# Patient Record
Sex: FEMALE | Race: BLACK OR AFRICAN AMERICAN | NOT HISPANIC OR LATINO | Employment: PART TIME | URBAN - METROPOLITAN AREA
[De-identification: names, ages, dates, MRNs, and addresses within clinical notes are randomized per-mention and may not be internally consistent; named-entity substitution may affect disease eponyms.]

---

## 2024-03-29 ENCOUNTER — PATIENT MESSAGE (OUTPATIENT)
Dept: FAMILY MEDICINE CLINIC | Facility: CLINIC | Age: 19
End: 2024-03-29
Payer: COMMERCIAL

## 2024-04-15 ENCOUNTER — OFFICE VISIT (OUTPATIENT)
Dept: FAMILY MEDICINE CLINIC | Facility: CLINIC | Age: 19
End: 2024-04-15
Payer: COMMERCIAL

## 2024-04-15 VITALS
DIASTOLIC BLOOD PRESSURE: 72 MMHG | HEIGHT: 60 IN | BODY MASS INDEX: 25.35 KG/M2 | OXYGEN SATURATION: 100 % | HEART RATE: 97 BPM | WEIGHT: 129.1 LBS | SYSTOLIC BLOOD PRESSURE: 104 MMHG

## 2024-04-15 DIAGNOSIS — J35.1 TONSILLAR HYPERTROPHY: ICD-10-CM

## 2024-04-15 DIAGNOSIS — L74.510 AXILLARY HYPERHIDROSIS: ICD-10-CM

## 2024-04-15 DIAGNOSIS — Z86.19 HISTORY OF CHLAMYDIA: ICD-10-CM

## 2024-04-15 DIAGNOSIS — J06.9 UPPER RESPIRATORY TRACT INFECTION, UNSPECIFIED TYPE: ICD-10-CM

## 2024-04-15 DIAGNOSIS — N89.8 VAGINAL DISCHARGE: Primary | ICD-10-CM

## 2024-04-15 DIAGNOSIS — K59.09 CHRONIC CONSTIPATION: ICD-10-CM

## 2024-04-15 LAB
B-HCG UR QL: NEGATIVE
BILIRUB BLD-MCNC: NEGATIVE MG/DL
CLARITY, POC: CLEAR
COLOR UR: YELLOW
EXPIRATION DATE: ABNORMAL
EXPIRATION DATE: NORMAL
GLUCOSE UR STRIP-MCNC: NEGATIVE MG/DL
INTERNAL NEGATIVE CONTROL: NORMAL
INTERNAL POSITIVE CONTROL: NORMAL
KETONES UR QL: NEGATIVE
LEUKOCYTE EST, POC: ABNORMAL
Lab: ABNORMAL
Lab: NORMAL
NITRITE UR-MCNC: NEGATIVE MG/ML
PH UR: 6 [PH] (ref 5–8)
PROT UR STRIP-MCNC: NEGATIVE MG/DL
RBC # UR STRIP: NEGATIVE /UL
SP GR UR: 1.02 (ref 1–1.03)
UROBILINOGEN UR QL: ABNORMAL

## 2024-04-15 PROCEDURE — 81025 URINE PREGNANCY TEST: CPT | Performed by: INTERNAL MEDICINE

## 2024-04-15 PROCEDURE — 81003 URINALYSIS AUTO W/O SCOPE: CPT | Performed by: INTERNAL MEDICINE

## 2024-04-15 PROCEDURE — 99214 OFFICE O/P EST MOD 30 MIN: CPT | Performed by: INTERNAL MEDICINE

## 2024-04-15 RX ORDER — ALUMINUM CHLORIDE 20 %
SOLUTION, NON-ORAL TOPICAL NIGHTLY
Qty: 60 ML | Refills: 11 | Status: SHIPPED | OUTPATIENT
Start: 2024-04-15

## 2024-04-15 NOTE — PROGRESS NOTES
Chief Complaint  Urinary Tract Infection (Irritation. )    Subjective        HPI   Duke presents to [facility] for concern for UTI.    Answers submitted by the patient for this visit:  Primary Reason for Visit (Submitted on 4/8/2024)  What is the primary reason for your visit?: Vaginal Discharge/Irritation  Female Genital Questionnaire (Submitted on 4/8/2024)  Chief Complaint: Female genitourinary complaint    The patient reports experiencing genital irritation on and off since 2020, with worsening symptoms since 2022. She has a history of bacterial vaginosis and chlamydia, with the most recent positive chlamydia test in November 2023. A couple of weeks ago, she experienced symptoms resembling a urinary tract infection, which partially resolved. Currently, she is experiencing discharge with a fishy odor but denies any burning during urination, pelvic pain, or vaginal bleeding. The patient is sexually active but has not engaged in sexual activity for the past three to four months.    She also reports a long-standing issue with excessive sweating, particularly in the armpit area. She has tried various over-the-counter deodorants and antiperspirants without success. She denies any other areas of excessive sweating.    The patient has a history of constipation and was referred to a gastroenterologist but has not yet seen one due to scheduling issues. She recently saw an ear, nose, and throat specialist for symptoms including dry mouth, throat pain, and trouble sleeping due to breathing issues at night. She is considering tonsillectomy.    She has been experiencing a cough for about a week, with a recent negative COVID test. She denies any history of asthma. She believes she is congested. No fevers or chills.      Objective   Vital Signs:  Vitals:    04/15/24 0854   BP: 104/72   BP Location: Left arm   Patient Position: Sitting   Cuff Size: Adult   Pulse: 97   SpO2: 100%   Weight: 58.6  "kg (129 lb 1.6 oz)   Height: 152.4 cm (60\")          Physical Exam  Exam conducted with a chaperone present (Heidy SINGH).   Constitutional:       General: She is not in acute distress.     Appearance: Normal appearance. She is not ill-appearing.   HENT:      Head: Normocephalic and atraumatic.      Mouth/Throat:      Pharynx: Posterior oropharyngeal erythema present. No oropharyngeal exudate.      Comments: Tonsillar hypertrophy (noted previously), no exudates  Eyes:      General:         Right eye: No discharge.         Left eye: No discharge.   Cardiovascular:      Rate and Rhythm: Normal rate.   Pulmonary:      Effort: No respiratory distress.      Breath sounds: No wheezing or rhonchi.   Genitourinary:     General: Normal vulva.      Exam position: Lithotomy position.      Labia:         Right: No rash or lesion.         Left: No rash or lesion.       Vagina: Vaginal discharge present. No erythema or bleeding.      Cervix: Discharge present. No cervical motion tenderness.   Neurological:      Mental Status: She is alert.          Result Review :                Assessment and Plan    Diagnoses and all orders for this visit:    1. Vaginal discharge (Primary)  -     NuSwab VG+ - Swab, Vagina  -     POCT urinalysis dipstick, automated  -     POCT pregnancy, urine    2. History of chlamydia  Assessment & Plan:  Twice. Checking today. Counseled pt on safer sex practices including consistent condom use.       3. Tonsillar hypertrophy  Assessment & Plan:  Dx with chronic tonsillitis per ENT. S/p Amoxil. May be getting tonsillectomy.      4. Chronic constipation  Assessment & Plan:  Has been going on for 1+ years. Was going a couple weeks wo BM at one point, now q3 days. No blood in stool. No FH colon CA or IBD as far as she knows. TSH and CBC normal. Discussed increasing fiber intake, continue probiotic and Mg Oxide, rec daily Miralax. Given ongoing unexplained constipation, recommended GI evaluation which I ordered. " She's had some issues getting appt. Spoke to our referrals coordinator who said all she needs to do is call scheduling to make appt.       5. Upper respiratory tract infection, unspecified type    6. Axillary hyperhidrosis  -     aluminum chloride (Drysol) 20 % external solution; Apply  topically to the appropriate area as directed Every Night. Use nightly. Once sweating is better, may decrease use to once or twice weekly. Wash treated area in the mornings.  Dispense: 60 mL; Refill: 11    Vaginal Irritation and Discharge:    - Performed pelvic exam and obtained vaginal swab for GC/CT, BV, candida, etc    - Encourage continued use of probiotics and incorporation of probiotic-rich foods.    - Await results of swab and communicate via MyChart.    - If positive for infection, prescribe appropriate antibiotics.    - UA unremarkable other than tr LE which I suspect is from the discharge, no dysuria. Pregnancy test negative. No CMT.    -Discussed safer sex practices including consistent condom use    Cough, likely due to viral URI  -Note negative COVID test at home  -Non-toxic appearing, lungs clear on exam, no fevers  -Recommend supportive care.   -Instruct patient to report any worsening symptoms, such as shortness of breath, coughing up purulent sputum, fevers, or chills, and/or persistent sxs.      Follow Up   Return if symptoms worsen or fail to improve.  Patient was given instructions and counseling regarding her condition or for health maintenance advice. Please see specific information pulled into the AVS if appropriate.     [employer] Ambient Scribe was used for this encounter, with the patient's verbal consent. Patient was informed that this service captures the clinical conversation and converts it to clinical documentation, allowing MD more time to focus on the patient. The clinical documentation was reviewed and edited by me. This tool is HIPAA compliant and does not store confidential patient information.

## 2024-04-15 NOTE — ASSESSMENT & PLAN NOTE
Has been going on for 1+ years. Was going a couple weeks wo BM at one point, now q3 days. No blood in stool. No FH colon CA or IBD as far as she knows. TSH and CBC normal. Discussed increasing fiber intake, continue probiotic and Mg Oxide, rec daily Miralax. Given ongoing unexplained constipation, recommended GI evaluation which I ordered. She's had some issues getting appt. Spoke to our referrals coordinator who said all she needs to do is call scheduling to make appt.

## 2024-04-17 ENCOUNTER — OFFICE VISIT (OUTPATIENT)
Dept: FAMILY MEDICINE CLINIC | Facility: CLINIC | Age: 19
End: 2024-04-17
Payer: COMMERCIAL

## 2024-04-17 VITALS
HEIGHT: 60 IN | BODY MASS INDEX: 25.32 KG/M2 | SYSTOLIC BLOOD PRESSURE: 108 MMHG | OXYGEN SATURATION: 98 % | WEIGHT: 129 LBS | DIASTOLIC BLOOD PRESSURE: 66 MMHG | HEART RATE: 85 BPM

## 2024-04-17 DIAGNOSIS — Z11.3 SCREENING EXAMINATION FOR STI: ICD-10-CM

## 2024-04-17 DIAGNOSIS — Z11.59 ENCOUNTER FOR HEPATITIS C SCREENING TEST FOR LOW RISK PATIENT: ICD-10-CM

## 2024-04-17 DIAGNOSIS — A54.9 GONORRHEA: Primary | ICD-10-CM

## 2024-04-17 DIAGNOSIS — Z11.4 SCREENING FOR HIV (HUMAN IMMUNODEFICIENCY VIRUS): ICD-10-CM

## 2024-04-17 DIAGNOSIS — Z86.19 HISTORY OF CHLAMYDIA: ICD-10-CM

## 2024-04-17 DIAGNOSIS — Z97.5 NEXPLANON IN PLACE: ICD-10-CM

## 2024-04-17 LAB
A VAGINAE DNA VAG QL NAA+PROBE: ABNORMAL SCORE
BVAB2 DNA VAG QL NAA+PROBE: ABNORMAL SCORE
C ALBICANS DNA VAG QL NAA+PROBE: NEGATIVE
C GLABRATA DNA VAG QL NAA+PROBE: NEGATIVE
C TRACH DNA VAG QL NAA+PROBE: NEGATIVE
MEGA1 DNA VAG QL NAA+PROBE: ABNORMAL SCORE
N GONORRHOEA DNA VAG QL NAA+PROBE: POSITIVE
T VAGINALIS DNA VAG QL NAA+PROBE: NEGATIVE

## 2024-04-17 RX ORDER — CEFTRIAXONE 500 MG/1
500 INJECTION, POWDER, FOR SOLUTION INTRAMUSCULAR; INTRAVENOUS ONCE
Status: COMPLETED | OUTPATIENT
Start: 2024-04-17 | End: 2024-04-17

## 2024-04-17 RX ADMIN — CEFTRIAXONE 500 MG: 500 INJECTION, POWDER, FOR SOLUTION INTRAMUSCULAR; INTRAVENOUS at 13:56

## 2024-04-17 NOTE — PROGRESS NOTES
"Chief Complaint  No chief complaint on file.    Subjective        HPI   Duke presents to [facility] for follow up. Her recent testing was + for gonorrhea. I had an opening in my schedule so we did this visit to discuss results, safer sexual practices, etc.    She has not had sex in several months. She was in a monogamous relationship at the time. She was not using condoms. No allergies to medications. Has nexplanon for birth control. Has follow up in the fall with peds gyn but suspects they won't see her much longer.         Objective   Vital Signs:  Vitals:    04/17/24 1355   BP: 108/66   BP Location: Left arm   Patient Position: Sitting   Cuff Size: Adult   Pulse: 85   SpO2: 98%   Weight: 58.5 kg (129 lb)   Height: 152.4 cm (60\")          Physical Exam  Constitutional:       General: She is not in acute distress.     Appearance: Normal appearance.   HENT:      Head: Normocephalic and atraumatic.   Eyes:      Conjunctiva/sclera: Conjunctivae normal.   Cardiovascular:      Rate and Rhythm: Normal rate.   Pulmonary:      Effort: Pulmonary effort is normal.   Musculoskeletal:         General: No swelling or deformity.   Skin:     Coloration: Skin is not jaundiced.      Findings: No rash.   Neurological:      General: No focal deficit present.      Mental Status: She is alert and oriented to person, place, and time.   Psychiatric:         Mood and Affect: Mood normal.         Behavior: Behavior normal.         Judgment: Judgment normal.          Result Review :                Assessment and Plan    Diagnoses and all orders for this visit:    1. Gonorrhea (Primary)  -     Ambulatory Referral to Gynecology    2. History of chlamydia  -     Ambulatory Referral to Gynecology    3. Nexplanon in place  -     Ambulatory Referral to Gynecology    We administered ceftriaxone 500 mg IM today.  It was ordered in a different encounter hence why it is not showing up in orders for this encounter.  I " told her that her testing is inconclusive for BV and that I think her symptoms are more likely related to gonorrhea  She is taking probiotics  Offered her HIV, hepatitis C, and syphilis testing.  She is agreeable.  We discussed safer sex practices including consistent condom use.  She also has a history of chlamydia as well.  Luckily, she has not developed PID but I told her my biggest concern with recurrent sexually transmitted infections is risk of scarring and infertility.  She expresses understanding.  She has already notified her prior partner regarding need for treatment  She is using Nexplanon for contraception  She is agreeable to an OB/GYN referral as Nexplanon will need to be changed out in the next year or so  We discussed the possibility that she could have gonococcal infection of her oropharynx given recurrent tonsillitis, but she denies any oral sexual practices.   We discussed that a test of cure for  gonorrhea is not absolutely necessary, but she would like to get a KYM  Will return to care in about 3 months      Follow Up   Return in about 3 months (around 7/17/2024) for Recheck, Next scheduled follow up.  Patient was given instructions and counseling regarding her condition or for health maintenance advice. Please see specific information pulled into the AVS if appropriate.     [employer] Scribe was used for this encounter, with the patient's verbal consent. Patient was informed that this service captures the clinical conversation and converts it to clinical documentation, allowing MD more time to focus on the patient. The clinical documentation was reviewed and edited by me. This tool is HIPAA compliant and does not store confidential patient information.

## 2024-04-18 LAB
HCV IGG SERPL QL IA: NON REACTIVE
HIV 1+2 AB+HIV1 P24 AG SERPL QL IA: NON REACTIVE
RPR SER QL: NON REACTIVE

## 2024-05-23 ENCOUNTER — TELEPHONE (OUTPATIENT)
Dept: GASTROENTEROLOGY | Facility: CLINIC | Age: 19
End: 2024-05-23

## 2024-05-23 NOTE — TELEPHONE ENCOUNTER
Hub staff attempted to follow warm transfer process and was unsuccessful     Caller: Duke Li    Relationship to patient: Self    Best call back number: [phone]    Patient is needing: PT RETURNED CALL ABOUT CANCELLING THE 7/30/24 APPT. NO DETAILS ABOUT RESCHEDULING. PLEASE CONFIRM AND CALL PT PLEASE.

## 2024-07-09 ENCOUNTER — OFFICE VISIT (OUTPATIENT)
Dept: GASTROENTEROLOGY | Facility: CLINIC | Age: 19
End: 2024-07-09
Payer: COMMERCIAL

## 2024-07-09 VITALS
SYSTOLIC BLOOD PRESSURE: 112 MMHG | HEIGHT: 60 IN | WEIGHT: 124 LBS | DIASTOLIC BLOOD PRESSURE: 74 MMHG | BODY MASS INDEX: 24.35 KG/M2

## 2024-07-09 DIAGNOSIS — R10.84 GENERALIZED ABDOMINAL PAIN: ICD-10-CM

## 2024-07-09 DIAGNOSIS — K58.1 IRRITABLE BOWEL SYNDROME WITH CONSTIPATION: Primary | ICD-10-CM

## 2024-07-09 PROCEDURE — 99204 OFFICE O/P NEW MOD 45 MIN: CPT

## 2024-07-09 RX ORDER — MULTIVITAMIN WITH IRON
250 TABLET ORAL DAILY
COMMUNITY

## 2024-07-09 RX ORDER — EPINEPHRINE 0.3 MG/.3ML
0.3 INJECTION SUBCUTANEOUS AS NEEDED
COMMUNITY
Start: 2024-05-07

## 2024-07-09 NOTE — PROGRESS NOTES
"    PATIENT INFORMATION  Duke Li       - 2005    CHIEF COMPLAINT  Chief Complaint   Patient presents with    Constipation    Abdominal Pain       HISTORY OF PRESENT ILLNESS    Here today for evaluation of constipation    Has had issues since . Skipping 4 days between BM, hard stools, no bleeding or straining. Magnesium tablet daily with some improvement. If stops taking loses track of days between BM. Has tried miralax, supp with no relief. This started when started college. Abdominal pain every time she gets constipated, skipped a week and had bad back pain.     No family hx CRC or polyps.    Constipation  Associated symptoms include abdominal pain and back pain (with constipation). Pertinent negatives include no diarrhea, nausea or vomiting.   Abdominal Pain  Associated symptoms include constipation. Pertinent negatives include no diarrhea, nausea or vomiting.       REVIEWED PERTINENT RESULTS/ LABS  No results found for: \"CASEREPORT\", \"FINALDX\"  Lab Results   Component Value Date    HGB 13.7 2024    MCV 85.7 2024     2024    ALT 25 2024    AST 29 2024    HGBA1C 5.30 2024      No results found.    REVIEW OF SYSTEMS  Review of Systems   Constitutional: Negative.    HENT: Negative.     Eyes: Negative.    Respiratory: Negative.     Gastrointestinal:  Positive for abdominal pain and constipation. Negative for diarrhea, nausea and vomiting.   Endocrine: Negative.    Genitourinary: Negative.    Musculoskeletal:  Positive for back pain (with constipation).   Skin: Negative.    Allergic/Immunologic: Negative.    Neurological: Negative.    Psychiatric/Behavioral: Negative.           ACTIVE PROBLEMS  Patient Active Problem List    Diagnosis     Gonorrhea [A54.9]     History of chlamydia [Z86.19]     Routine health maintenance [Z00.00]     Chronic constipation [K59.09]     Tonsillar hypertrophy [J35.1]          PAST MEDICAL HISTORY  History reviewed. No " "pertinent past medical history.      SURGICAL HISTORY  History reviewed. No pertinent surgical history.      FAMILY HISTORY  Family History   Problem Relation Age of Onset    Asthma Mother     Asthma Maternal Grandmother     Diabetes Maternal Grandmother          SOCIAL HISTORY  Social History     Occupational History    Not on file   Tobacco Use    Smoking status: Never    Smokeless tobacco: Never   Substance and Sexual Activity    Alcohol use: Never    Drug use: Never    Sexual activity: Yes     Partners: Male     Birth control/protection: Nexplanon         CURRENT MEDICATIONS    Current Outpatient Medications:     aluminum chloride (Drysol) 20 % external solution, Apply  topically to the appropriate area as directed Every Night. Use nightly. Once sweating is better, may decrease use to once or twice weekly. Wash treated area in the mornings., Disp: 60 mL, Rfl: 11    Magnesium 250 MG tablet, Take 1 tablet by mouth Daily., Disp: , Rfl:     EPINEPHrine (EPIPEN) 0.3 MG/0.3ML solution auto-injector injection, Inject 0.3 mL into the appropriate muscle as directed by prescriber As Needed. (Patient not taking: Reported on 7/9/2024), Disp: , Rfl:     linaclotide (LINZESS) 145 MCG capsule capsule, Take 1 capsule by mouth Every Morning Before Breakfast., Disp: 30 capsule, Rfl: 11    ALLERGIES  Patient has no known allergies.    VITALS  Vitals:    07/09/24 0843   BP: 112/74   BP Location: Left arm   Patient Position: Sitting   Cuff Size: Adult   Weight: 56.2 kg (124 lb)   Height: 152.4 cm (60\")       PHYSICAL EXAM  Debilities/Disabilities Identified: None  Emotional Behavior: Appropriate  Wt Readings from Last 3 Encounters:   07/09/24 56.2 kg (124 lb)   04/17/24 58.5 kg (129 lb)   04/15/24 58.6 kg (129 lb 1.6 oz)     Ht Readings from Last 1 Encounters:   07/09/24 152.4 cm (60\")     Body mass index is 24.22 kg/m².  Physical Exam  Constitutional:       General: She is not in acute distress.     Appearance: Normal appearance. " She is not ill-appearing.   HENT:      Head: Normocephalic and atraumatic.      Mouth/Throat:      Mouth: Mucous membranes are moist.      Pharynx: No posterior oropharyngeal erythema.   Eyes:      General: No scleral icterus.  Cardiovascular:      Rate and Rhythm: Normal rate and regular rhythm.      Heart sounds: Normal heart sounds.   Pulmonary:      Effort: Pulmonary effort is normal.      Breath sounds: Normal breath sounds.   Abdominal:      General: Abdomen is flat. Bowel sounds are normal. There is no distension.      Palpations: Abdomen is soft. There is no mass.      Tenderness: There is no abdominal tenderness in the right lower quadrant. There is no guarding or rebound. Negative signs include Kc's sign.      Hernia: No hernia is present.   Musculoskeletal:      Cervical back: Neck supple.   Skin:     General: Skin is warm.      Capillary Refill: Capillary refill takes less than 2 seconds.   Neurological:      General: No focal deficit present.      Mental Status: She is alert and oriented to person, place, and time.   Psychiatric:         Mood and Affect: Mood normal.         Behavior: Behavior normal.         Thought Content: Thought content normal.         Judgment: Judgment normal.         CLINICAL DATA REVIEWED   reviewed previous lab results and integrated with today's visit, reviewed notes from other physicians and/or last GI encounter, reviewed previous endoscopy results and available photos, reviewed surgical pathology results from previous biopsies    ASSESSMENT  Diagnoses and all orders for this visit:    Irritable bowel syndrome with constipation    Generalized abdominal pain    Other orders  -     Magnesium 250 MG tablet; Take 1 tablet by mouth Daily.  -     EPINEPHrine (EPIPEN) 0.3 MG/0.3ML solution auto-injector injection; Inject 0.3 mL into the appropriate muscle as directed by prescriber As Needed. (Patient not taking: Reported on 7/9/2024)  -     linaclotide (LINZESS) 145 MCG capsule  capsule; Take 1 capsule by mouth Every Morning Before Breakfast.          PLAN    Start linzess, PRN miralax, call back in 2 weeks if not strong enough  If unable to relieve constipation, will schedule colonoscopy  Reviewed water, fiber, exercise    Return in about 3 months (around 10/9/2024).    I have discussed the above plan with the patient.  They verbalize understanding and are in agreement with the plan.  They have been advised to contact the office for any questions, concerns, or changes related to their health.

## 2024-07-15 ENCOUNTER — OFFICE VISIT (OUTPATIENT)
Dept: OBSTETRICS AND GYNECOLOGY | Age: 19
End: 2024-07-15
Payer: COMMERCIAL

## 2024-07-15 VITALS
WEIGHT: 124.2 LBS | SYSTOLIC BLOOD PRESSURE: 106 MMHG | BODY MASS INDEX: 24.39 KG/M2 | HEIGHT: 60 IN | DIASTOLIC BLOOD PRESSURE: 64 MMHG

## 2024-07-15 DIAGNOSIS — Z01.419 WELL WOMAN EXAM WITH ROUTINE GYNECOLOGICAL EXAM: Primary | ICD-10-CM

## 2024-07-15 DIAGNOSIS — Z11.3 SCREENING FOR STDS (SEXUALLY TRANSMITTED DISEASES): ICD-10-CM

## 2024-07-15 DIAGNOSIS — Z97.5 NEXPLANON IN PLACE: ICD-10-CM

## 2024-07-15 DIAGNOSIS — N89.8 VAGINAL DISCHARGE: ICD-10-CM

## 2024-07-15 PROCEDURE — 99385 PREV VISIT NEW AGE 18-39: CPT

## 2024-07-15 NOTE — PROGRESS NOTES
"Subjective     History of Present Illness    Chief Complaint   Patient presents with    Gynecologic Exam     New gyn est care- c/o discharge w/ odor       Duke Li is a 19 y.o. female who presents for annual exam.  New patient, here to establish care.  Nexplanon inserted 2023, on L arm. No menses with this.   C/o vaginal discharge x few months. Hx BV. White, milky discharge. Sweaty odor, not fishy. No vaginal itching, pain with urination, or fevers.  Sexually active. Agrees to STD testing with swab.  Works at [employer]. 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    Obstetric History:  OB History          0    Para   0    Term   0       0    AB   0    Living   0         SAB   0    IAB   0    Ectopic   0    Molar   0    Multiple   0    Live Births   0               Menstrual History:     No LMP recorded. Patient has had an implant.           Current contraception: Nexplanon  History of abnormal Pap smear:  n/a  Received Gardasil immunization: yes - completed all 3 dose  Perform regular self breast exam: no  Family history of uterine or ovarian cancer: no  Family History of colon cancer: no  Family history of breast cancer: no    PAP: not indicated   Mammogram: not indicated  Colonoscopy: not indicated  DEXA: not indicated    Exercise: moderately active  Calcium/Vitamin D: adequate intake    The following portions of the patient's history were reviewed and updated as appropriate: allergies, current medications, past family history, past medical history, past social history, past surgical history, and problem list.    Review of Systems  A comprehensive review of systems was negative.       Objective   Physical Exam    /64   Ht 152.4 cm (60\")   Wt 56.3 kg (124 lb 3.2 oz)   BMI 24.26 kg/m²      General: alert, appears stated age, cooperative, no distress, and Nexplanon palpated on L arm   Heart: regular rate and rhythm, S1, S2 normal, no murmur, click, rub or gallop "   Lungs: clear to auscultation bilaterally   Abdomen: soft, non-tender, without masses or organomegaly   Breast: inspection negative, no nipple discharge or bleeding, no masses or nodularity palpable   External genitalia/Vulva: External genitalia including bartholin's glands, Urethra, [place]'s gland and urethra meatus are normal and Bladder appears normal without significant prolapse    Vagina: normal mucosa, normal discharge   Cervix: no lesions   Uterus: normal size and non-tender   Adnexa: normal adnexa and no mass, fullness, tenderness   Neurologic: Alert and Oriented x3   Psychiatric: Normal affect, judgement, and mood       Assessment & Plan   Diagnoses and all orders for this visit:    1. Well woman exam with routine gynecological exam (Primary)    2. Vaginal discharge  -     NuSwab VG+ - Swab, Vagina    3. Screening for STDs (sexually transmitted diseases)  -     NuSwab VG+ - Swab, Vagina    4. Nexplanon in place          NuSwab VG+ done today  Will call patient with results and treat accordingly.   All questions answered.  Breast self exam technique reviewed and patient encouraged to perform self-exam monthly.  Physical activity and regular exercise encouraged.  Discussed healthy lifestyle modifications.  Sexual transmitted disease prevention discussed  Vaccinations/immunizations discussed  Contraception discussed  Plan for pap smear next year, at 19 y/o.      Return in about 1 year (around 7/15/2025) for Annual Exam.

## 2024-07-16 ENCOUNTER — PATIENT ROUNDING (BHMG ONLY) (OUTPATIENT)
Dept: OBSTETRICS AND GYNECOLOGY | Age: 19
End: 2024-07-16
Payer: COMMERCIAL

## 2024-07-17 LAB
A VAGINAE DNA VAG QL NAA+PROBE: NORMAL SCORE
BVAB2 DNA VAG QL NAA+PROBE: NORMAL SCORE
C ALBICANS DNA VAG QL NAA+PROBE: NEGATIVE
C GLABRATA DNA VAG QL NAA+PROBE: NEGATIVE
C TRACH DNA SPEC QL NAA+PROBE: NEGATIVE
MEGA1 DNA VAG QL NAA+PROBE: NORMAL SCORE
N GONORRHOEA DNA VAG QL NAA+PROBE: NEGATIVE
T VAGINALIS DNA VAG QL NAA+PROBE: NEGATIVE

## 2025-01-23 ENCOUNTER — OFFICE VISIT (OUTPATIENT)
Dept: GASTROENTEROLOGY | Facility: CLINIC | Age: 20
End: 2025-01-23
Payer: COMMERCIAL

## 2025-01-23 VITALS
DIASTOLIC BLOOD PRESSURE: 62 MMHG | WEIGHT: 131 LBS | HEIGHT: 60 IN | BODY MASS INDEX: 25.72 KG/M2 | SYSTOLIC BLOOD PRESSURE: 104 MMHG

## 2025-01-23 DIAGNOSIS — K59.04 CHRONIC IDIOPATHIC CONSTIPATION: Primary | ICD-10-CM

## 2025-01-23 NOTE — PROGRESS NOTES
"      PATIENT INFORMATION  Duke Li       - 2005    CHIEF COMPLAINT  Chief Complaint   Patient presents with    Irritable Bowel Syndrome    Constipation       HISTORY OF PRESENT ILLNESS  Here today for Constipation follow-up    Per LOV: Has had issues since  when starting college, moving to campus. Skipping 4 days between BM, hard stools, no bleeding or straining. Magnesium tablet daily with some improvement. If stops taking loses track of days between BM. Has tried miralax, supp with no relief. This started when started college. Abdominal pain every time she gets constipated, skipped a week and had bad back pain. Started 145 mcg linzess with PRN miralax and to call back if linzess not working well enough.    TODAY: Stomach feeling better with linzess, bowels moving 4 times a day, starts solid and then more liquid. NO hard stools or bleeding. Cramping resolved. No longer taking magnesium. If skips linzess, takes magnesium, will skip linzess twice a week for work. Reviewed if adding fiber supplement is not effective enough we can de-escalate dose. If no response to fiber, will consider decreasing linzess to 72.     No family hx CRC or polyps.          REVIEWED PERTINENT RESULTS/ LABS  No results found for: \"CASEREPORT\", \"FINALDX\"  Lab Results   Component Value Date    HGB 13.7 2024    MCV 85.7 2024     2024    ALT 25 2024    AST 29 2024    HGBA1C 5.30 2024      No results found.    REVIEW OF SYSTEMS  Review of Systems      ACTIVE PROBLEMS  Patient Active Problem List    Diagnosis     Gonorrhea [A54.9]     History of chlamydia [Z86.19]     Routine health maintenance [Z00.00]     Chronic constipation [K59.09]     Tonsillar hypertrophy [J35.1]          PAST MEDICAL HISTORY  History reviewed. No pertinent past medical history.      SURGICAL HISTORY  History reviewed. No pertinent surgical history.      FAMILY HISTORY  Family History   Problem Relation Age of " "Onset    Asthma Mother     Asthma Maternal Grandmother     Diabetes Maternal Grandmother     Breast cancer Neg Hx     Ovarian cancer Neg Hx     Uterine cancer Neg Hx     Colon cancer Neg Hx          SOCIAL HISTORY  Social History     Occupational History    Not on file   Tobacco Use    Smoking status: Never    Smokeless tobacco: Never   Vaping Use    Vaping status: Never Used   Substance and Sexual Activity    Alcohol use: Never    Drug use: Never    Sexual activity: Yes     Partners: Male     Birth control/protection: Nexplanon         CURRENT MEDICATIONS    Current Outpatient Medications:     aluminum chloride (Drysol) 20 % external solution, Apply  topically to the appropriate area as directed Every Night. Use nightly. Once sweating is better, may decrease use to once or twice weekly. Wash treated area in the mornings., Disp: 60 mL, Rfl: 11    EPINEPHrine (EPIPEN) 0.3 MG/0.3ML solution auto-injector injection, Inject 0.3 mL into the appropriate muscle as directed by prescriber As Needed., Disp: , Rfl:     linaclotide (LINZESS) 145 MCG capsule capsule, Take 1 capsule by mouth Every Morning Before Breakfast., Disp: 30 capsule, Rfl: 11    Magnesium 250 MG tablet, Take 1 tablet by mouth Daily., Disp: , Rfl:     ALLERGIES  Patient has no known allergies.    VITALS  Vitals:    01/23/25 1312   BP: 104/62   BP Location: Left arm   Patient Position: Sitting   Cuff Size: Adult   Weight: 59.4 kg (131 lb)   Height: 152.4 cm (60\")       PHYSICAL EXAM  Debilities/Disabilities Identified: None  Emotional Behavior: Appropriate  Wt Readings from Last 3 Encounters:   01/23/25 59.4 kg (131 lb)   07/15/24 56.3 kg (124 lb 3.2 oz)   07/09/24 56.2 kg (124 lb)     Ht Readings from Last 1 Encounters:   01/23/25 152.4 cm (60\")     Body mass index is 25.58 kg/m².  Physical Exam  Constitutional:       General: She is not in acute distress.     Appearance: Normal appearance. She is not ill-appearing.   HENT:      Head: Normocephalic and " atraumatic.      Mouth/Throat:      Mouth: Mucous membranes are moist.      Pharynx: No posterior oropharyngeal erythema.   Eyes:      General: No scleral icterus.  Cardiovascular:      Rate and Rhythm: Normal rate and regular rhythm.      Heart sounds: Normal heart sounds.   Pulmonary:      Effort: Pulmonary effort is normal.      Breath sounds: Normal breath sounds.   Abdominal:      General: Abdomen is flat. Bowel sounds are normal. There is no distension.      Palpations: Abdomen is soft. There is no mass.      Tenderness: There is no abdominal tenderness. There is no guarding or rebound. Negative signs include Kc's sign.      Hernia: No hernia is present.   Musculoskeletal:      Cervical back: Neck supple.   Skin:     General: Skin is warm.      Capillary Refill: Capillary refill takes less than 2 seconds.   Neurological:      General: No focal deficit present.      Mental Status: She is alert and oriented to person, place, and time.   Psychiatric:         Mood and Affect: Mood normal.         Behavior: Behavior normal.         Thought Content: Thought content normal.         Judgment: Judgment normal.         CLINICAL DATA REVIEWED   reviewed previous lab results and integrated with today's visit, reviewed notes from other physicians and/or last GI encounter, reviewed previous endoscopy results and available photos, reviewed surgical pathology results from previous biopsies    ASSESSMENT  Diagnoses and all orders for this visit:    Chronic idiopathic constipation          PLAN    Linzess daily and will add fiber supplement and review at follow up if colonoscopy or CT needed  May consider trying 72 linzess    Return in about 3 months (around 4/23/2025).    I have discussed the above plan with the patient.  They verbalize understanding and are in agreement with the plan.  They have been advised to contact the office for any questions, concerns, or changes related to their health.

## 2025-04-18 ENCOUNTER — OFFICE VISIT (OUTPATIENT)
Dept: OBSTETRICS AND GYNECOLOGY | Age: 20
End: 2025-04-18
Payer: COMMERCIAL

## 2025-04-18 VITALS
DIASTOLIC BLOOD PRESSURE: 68 MMHG | WEIGHT: 130 LBS | BODY MASS INDEX: 25.52 KG/M2 | SYSTOLIC BLOOD PRESSURE: 110 MMHG | HEIGHT: 60 IN

## 2025-04-18 DIAGNOSIS — N92.6 IRREGULAR BLEEDING: Primary | ICD-10-CM

## 2025-04-18 DIAGNOSIS — Z97.5 NEXPLANON IN PLACE: ICD-10-CM

## 2025-04-18 DIAGNOSIS — Z11.3 SCREENING FOR STDS (SEXUALLY TRANSMITTED DISEASES): ICD-10-CM

## 2025-04-18 NOTE — PROGRESS NOTES
"Subjective     History of Present Illness  Duke Li is a 20 y.o.  female is being seen today for   Chief Complaint   Patient presents with    Gynecologic Exam     Gyn f/u bleeding w/ nexplanon after menses, pt did have discharge & odor but not anymore     C/o irregular bleeding x 4 days. She was having white discharge and a sweaty odor, so she used a boric acid vaginal suppository 4 days ago. Patient then began bleeding after using the boric acid and has continued bleeding since. Started light, now heavier. Changing pad or tampon every 2-3 hours. Nexplanon inserted 2023 on L arm. Reports she usually only bleeds every few months with nexplanon. Has not used any boric acid suppositories since. Did start using new dove scented soap. Admits stress with school, had anatomy lab final last week. No longer having vaginal discharge or odor. No new sex partners. Agreeable STD testing. No vaginal itching. No recent illness.         The following portions of the patient's history were reviewed and updated as appropriate: allergies, current medications, past family history, past medical history, past social history, past surgical history and problem list.    /68   Ht 152.4 cm (60\")   Wt 59 kg (130 lb)   BMI 25.39 kg/m²         Review of Systems   Constitutional: Negative.    HENT: Negative.     Eyes: Negative.    Respiratory: Negative.     Cardiovascular: Negative.    Gastrointestinal: Negative.    Endocrine: Negative.    Genitourinary:  Positive for vaginal discharge.   Musculoskeletal: Negative.    Skin: Negative.    Allergic/Immunologic: Negative.    Neurological: Negative.    Hematological: Negative.    Psychiatric/Behavioral: Negative.         Objective   Physical Exam  Constitutional:       General: She is not in acute distress.  Cardiovascular:      Rate and Rhythm: Normal rate and regular rhythm.      Pulses: Normal pulses.      Heart sounds: Normal heart sounds.   Pulmonary:      Effort: " Pulmonary effort is normal.      Breath sounds: Normal breath sounds.   Genitourinary:     Exam position: Lithotomy position.      Labia:         Right: No rash, tenderness or lesion.         Left: No rash, tenderness or lesion.       Vagina: Normal.      Cervix: Cervical bleeding (consistent with bleeding noted) present. No cervical motion tenderness.      Uterus: Normal.       Adnexa: Right adnexa normal and left adnexa normal.   Musculoskeletal:      Comments: Nexplanon palpated on L arm   Neurological:      Mental Status: She is alert and oriented to person, place, and time.   Psychiatric:         Mood and Affect: Mood normal.         Behavior: Behavior normal.         Thought Content: Thought content normal.         Judgment: Judgment normal.           Assessment & Plan   Diagnoses and all orders for this visit:    1. Irregular bleeding (Primary)  -     NuSwab VG+ - Swab, Vagina    2. Screening for STDs (sexually transmitted diseases)  -     NuSwab VG+ - Swab, Vagina    3. Nexplanon in place    -No polyps or cuts causing bleeding on pelvic exam. Discussed irregular bleeding could be due to stress, vaginal infection, and/or nexplanon. Nuswab VG+ completed today. Will call patient with results and treat accordingly. Offered OCP taper to stop bleeding, patient declines at this time and will monitor bleeding.     All questions answered. Patient verbalizes understanding and is agreeable to plan.  Return if symptoms worsen or fail to improve.         Minerva Nelson PA-C  4/18/2025 18:08 EDT

## 2025-04-21 DIAGNOSIS — N76.0 BV (BACTERIAL VAGINOSIS): Primary | ICD-10-CM

## 2025-04-21 DIAGNOSIS — B96.89 BV (BACTERIAL VAGINOSIS): Primary | ICD-10-CM

## 2025-04-21 LAB
A VAGINAE DNA VAG QL NAA+PROBE: ABNORMAL SCORE
BVAB2 DNA VAG QL NAA+PROBE: ABNORMAL SCORE
C ALBICANS DNA VAG QL NAA+PROBE: NEGATIVE
C GLABRATA DNA VAG QL NAA+PROBE: NEGATIVE
C TRACH DNA SPEC QL NAA+PROBE: NEGATIVE
MEGA1 DNA VAG QL NAA+PROBE: ABNORMAL SCORE
N GONORRHOEA DNA VAG QL NAA+PROBE: NEGATIVE
T VAGINALIS DNA VAG QL NAA+PROBE: NEGATIVE

## 2025-04-21 RX ORDER — METRONIDAZOLE 500 MG/1
500 TABLET ORAL 2 TIMES DAILY
Qty: 14 TABLET | Refills: 0 | Status: SHIPPED | OUTPATIENT
Start: 2025-04-21 | End: 2025-04-28

## 2025-07-15 ENCOUNTER — OFFICE VISIT (OUTPATIENT)
Dept: OBSTETRICS AND GYNECOLOGY | Age: 20
End: 2025-07-15
Payer: COMMERCIAL

## 2025-07-15 VITALS
SYSTOLIC BLOOD PRESSURE: 110 MMHG | WEIGHT: 134 LBS | HEIGHT: 60 IN | DIASTOLIC BLOOD PRESSURE: 64 MMHG | BODY MASS INDEX: 26.31 KG/M2

## 2025-07-15 DIAGNOSIS — Z11.3 SCREEN FOR STD (SEXUALLY TRANSMITTED DISEASE): ICD-10-CM

## 2025-07-15 DIAGNOSIS — N89.8 VAGINAL DISCHARGE: Primary | ICD-10-CM

## 2025-07-15 PROCEDURE — 99213 OFFICE O/P EST LOW 20 MIN: CPT | Performed by: PHYSICIAN ASSISTANT

## 2025-07-15 NOTE — PROGRESS NOTES
"Subjective     Chief Complaint   Patient presents with    Gynecologic Exam     C/o vaginal discharge.       Duke Li is a 20 y.o.  whose LMP is Patient's last menstrual period was 2025 (approximate). 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, nursing    No Additional Complaints Reported    The following portions of the patient's history were reviewed and updated as appropriate:no additional history reviewed, vital signs, allergies, current medications, past medical history, past social history, past surgical history, and problem list      Review of Systems   Genitourinary:positive for vaginal discharge     Objective      /64   Ht 152.4 cm (60\")   Wt 60.8 kg (134 lb)   LMP 2025 (Approximate)   BMI 26.17 kg/m²     Physical Exam    General:   alert, comfortable, and no distress   Heart: Not performed today   Lungs: Not performed today.   Breast: Not performed today   Neck: Not performed today   Abdomen: Not performed today   CVA: Not performed today   Pelvis: External genitalia: normal general appearance  Vaginal: normal mucosa without prolapse or lesions and presence of blood  Cervix: normal appearance   Extremities: Not performed today   Neurologic: negative   Psychiatric: Normal affect, judgement, and mood       Lab Review   Labs: No data reviewed    Imaging   No data reviewed    Assessment & Plan     ASSESSMENT  1. Vaginal discharge    2. Screen for STD (sexually transmitted disease)          PLAN  1.   Orders Placed This Encounter   Procedures    NuSwab VG+ - Swab, Vagina       2. She will wait on results " prior to starting medication. Info provided on BV. Enc to decrease tampon use    Follow up: as scheduled     SCOTT Silva  7/15/2025

## 2025-08-15 ENCOUNTER — OFFICE VISIT (OUTPATIENT)
Dept: OBSTETRICS AND GYNECOLOGY | Age: 20
End: 2025-08-15
Payer: COMMERCIAL

## 2025-08-15 VITALS — WEIGHT: 133 LBS | BODY MASS INDEX: 25.97 KG/M2

## 2025-08-15 DIAGNOSIS — Z11.3 SCREEN FOR STD (SEXUALLY TRANSMITTED DISEASE): ICD-10-CM

## 2025-08-15 DIAGNOSIS — Z12.4 SCREENING FOR MALIGNANT NEOPLASM OF CERVIX: ICD-10-CM

## 2025-08-15 DIAGNOSIS — Z01.419 WELL FEMALE EXAM WITH ROUTINE GYNECOLOGICAL EXAM: Primary | ICD-10-CM

## 2025-08-15 DIAGNOSIS — Z11.51 SCREENING FOR HUMAN PAPILLOMAVIRUS (HPV): ICD-10-CM

## 2025-08-21 LAB
CYTOLOGIST CVX/VAG CYTO: ABNORMAL
CYTOLOGY CVX/VAG DOC CYTO: ABNORMAL
CYTOLOGY CVX/VAG DOC THIN PREP: ABNORMAL
DX ICD CODE: ABNORMAL
DX ICD CODE: ABNORMAL
HPV I/H RISK 4 DNA CVX QL PROBE+SIG AMP: POSITIVE
HPV16 DNA CVX QL PROBE+SIG AMP: NEGATIVE
HPV18+45 E6+E7 MRNA CVX QL NAA+PROBE: NEGATIVE
OTHER STN SPEC: ABNORMAL
PATHOLOGIST CVX/VAG CYTO: ABNORMAL
SERVICE CMNT-IMP: ABNORMAL
STAT OF ADQ CVX/VAG CYTO-IMP: ABNORMAL